# Patient Record
(demographics unavailable — no encounter records)

---

## 2025-03-04 NOTE — PHYSICAL EXAM
[Chaperone Declined] : Patient declined chaperone [Appropriately responsive] : appropriately responsive [Alert] : alert [No Acute Distress] : no acute distress [Soft] : soft [Non-tender] : non-tender [Non-distended] : non-distended [No HSM] : No HSM [No Lesions] : no lesions [No Mass] : no mass [Oriented x3] : oriented x3 [Examination Of The Breasts] : a normal appearance [No Masses] : no breast masses were palpable [Labia Majora] : normal [Labia Minora] : normal [Atrophy] : atrophy [Normal] : normal [Uterine Adnexae] : normal [FreeTextEntry4] : mild introital atrophy

## 2025-03-04 NOTE — HISTORY OF PRESENT ILLNESS
[FreeTextEntry1] : 69yo P2 PM (2003) woman here for annual exam  no complaints no gyn complaints  son diagnosed w melanoma  [Patient reported bone density results were normal] : Patient reported bone density results were normal [Mammogramdate] : 2024 [BoneDensityDate] : 2024 [ColonoscopyDate] : 2024 [TextBox_43] : one polyp

## 2025-03-04 NOTE — DISCUSSION/SUMMARY
[FreeTextEntry1] : well woman  f/u pap discussed continued gyn screening   recent colon polyp removed benign), f/u 5 years  negative depression screen very active  discussed wt barring exercise

## 2025-03-04 NOTE — COUNSELING
[Nutrition/ Exercise/ Weight Management] : nutrition, exercise, weight management [Breast Self Exam] : breast self exam [Medication Management] : medication management [FreeTextEntry2] : exercise